# Patient Record
Sex: MALE | URBAN - METROPOLITAN AREA
[De-identification: names, ages, dates, MRNs, and addresses within clinical notes are randomized per-mention and may not be internally consistent; named-entity substitution may affect disease eponyms.]

---

## 2018-08-21 ENCOUNTER — THERAPY VISIT (OUTPATIENT)
Dept: PHYSICAL THERAPY | Facility: CLINIC | Age: 78
End: 2018-08-21
Payer: MEDICARE

## 2018-08-21 DIAGNOSIS — M54.9 BACK PAIN: Primary | ICD-10-CM

## 2018-08-21 PROCEDURE — 97161 PT EVAL LOW COMPLEX 20 MIN: CPT | Mod: GP | Performed by: PHYSICAL THERAPIST

## 2018-08-21 PROCEDURE — G8982 BODY POS GOAL STATUS: HCPCS | Mod: GP | Performed by: PHYSICAL THERAPIST

## 2018-08-21 PROCEDURE — G8981 BODY POS CURRENT STATUS: HCPCS | Mod: GP | Performed by: PHYSICAL THERAPIST

## 2018-08-21 PROCEDURE — 97110 THERAPEUTIC EXERCISES: CPT | Mod: GP | Performed by: PHYSICAL THERAPIST

## 2018-08-21 NOTE — LETTER
DEPARTMENT OF HEALTH AND HUMAN SERVICES  CENTERS FOR MEDICARE & MEDICAID SERVICES    PLAN/UPDATED PLAN OF PROGRESS FOR OUTPATIENT REHABILITATION    PATIENTS NAME:  Wood Magdaleno   : 1940  PROVIDER NUMBER:    [id number]  [id number]   PROVIDER NAME: [facility] VIVIEN RIZVI  MEDICAL RECORD NUMBER: [id number]   START OF CARE DATE:  SOC Date: 18   TYPE:  PT  PRIMARY/TREATMENT DIAGNOSIS: (Pertinent Medical Diagnosis)  Back pain  VISITS FROM START OF CARE:  Rxs Used: 1     [facility] Initial Evaluation  Subjective:  Patient is a 78 year old male presenting with rehab back hpi. The history is provided by the patient. No  was used.   Wood Magdaleno is a 78 year old male with a lumbar condition.  Condition occurred with:  Degenerative joint disease and other reason.  Condition occurred: for unknown reasons.  This is a chronic and recurrent condition  Patient presents to PT today with c/o back pain.  Patient stated he has been dealing with back pain for many years on and off.  Patient stated he has fallen many time due to his balance; last fall a few weeks ago requiring stitches above the L eye.  Patient reported [facility].  Patient referred to PT on 18..    Patient reports pain:  Lower lumbar spine and mid lumbar spine.  Radiates to:  No radiation.  Pain is described as aching and is constant and reported as 5/10.  Associated with: none. Pain is the same all the time and worse in the A.M..  Symptoms are exacerbated by lying down, sitting, lifting, standing and other (transfers) and relieved by NSAID's and activity/movement.  Since onset symptoms are unchanged.  Special tests:  X-ray.      General health as reported by patient is good.  Pertinent medical history includes:  Cancer, high blood pressure, overweight and other (Parkinson's Disease).  Medical allergies:  no.  Surgical history: none reported.  Current medications:  High blood pressure medication.  Current occupation is Retired.      Barriers include:  None as reported by the patient.  Red flags:  None as reported by the patient.    Objective:  Standing Alignment:    Cervical/Thoracic:  Forward head and thoracic kyphosis increased  Shoulder/UE:  Rounded shoulders  Lumbar:  Lordosis decr    Gait:  Used quad cane when he walks; shuffles his feet  Gait Type:  Antalgic   Weight Bearing Status:  WBAT   Assistive Devices:  Cane  Deviations:  Lumbar:  Trunk flexionHip:  Trendelenberg L and Trendelenberg RKnee:  Knee extension decr L and knee extension decr RGeneral Deviations:  Base of support incr, stance time decr and stride length decr                           PATIENTS NAME:  Wood Magdaleno            : 1940  Lumbar/SI Evaluation  ROM:    AROM Lumbar:   Flexion:          Can touch ankle with knee flexed  Ext:                    Neutral spine; pt feels he is falling backwards when he stands up tall   Side Bend:        Left:  Mid thigh    Right:  Mid thigh  Rotation:           Left:     Right:   Side Glide:        Left:     Right:    Lumbar Myotomes:    T12-L3 (Hip Flex):  Left: 4    Right: 4  L2-4 (Quads):  Left:  4    Right:  4  L4 (Ankle DF):  Left:  5    Right:  5  Lumbar DTR's:  not assessed  Lumbar Dermtomes:  normal  Neural Tension/Mobility:  Neural tension wnl lumbar: c/o tightness in each leg when they are lifted.    Left side:SLR or SLR w/DF  negative.   Right side:   SLR w/DF or SLR  negative.   Lumbar Palpation:    Tenderness present at Left:    Quadratus Lumborum and Erector Spinae  Tenderness present at Right: Quadratus Lumborum and Erector Spinae    Spinal Segmental Conclusions:   Level: Hypo noted at T10, T11, T12, L1, L2, L3, L4 and L5  Hip Evaluation  Hip PROM:  Hip PROM:  Left Hip:    Normal  Right Hip:  Normal    Assessment/Plan:    Patient is a 78 year old male with thoracic and lumbar  complaints.    Patient has the following significant findings with corresponding treatment plan.                Diagnosis 1:  Back pain  Pain -  hot/cold therapy and manual therapy  Decreased ROM/flexibility - manual therapy and therapeutic exercise  Decreased joint mobility - manual therapy and therapeutic exercise  Decreased strength - therapeutic exercise and therapeutic activities  Impaired gait - gait training  Impaired muscle performance - neuro re-education  Decreased function - therapeutic activities  Impaired posture - neuro re-education    Therapy Evaluation Codes:   1) History comprised of:   Personal factors that impact the plan of care:      Age and Past/current experiences.    Comorbidity factors that impact the plan of care are:      Parkinson's Disease.     Medications impacting care: None.  2) Examination of Body Systems comprised of:   Body structures and functions that impact the plan of care:      Lumbar spine and Thoracic Spine.                  PATIENTS NAME:  Wood Magdaleno            : 1940   Activity limitations that impact the plan of care are:      Bending, Sitting, Standing and Walking.  3) Clinical presentation characteristics are:   Stable/Uncomplicated.  4) Decision-Making    Low complexity using standardized patient assessment instrument and/or measureable assessment of functional outcome.  Cumulative Therapy Evaluation is: Low complexity.  Previous and current functional limitations:  (See Goal Flow Sheet for this information)    Short term and Long term goals: (See Goal Flow Sheet for this information)   Communication ability:  Patient appears to be able to clearly communicate and understand verbal and written communication and follow directions correctly.  Treatment Explanation - The following has been discussed with the patient:   RX ordered/plan of care  Anticipated outcomes  Possible risks and side effects  This patient would benefit from PT intervention to resume  "normal activities.   Rehab potential is good.    Frequency:  1 X week, once daily  Duration:  for 6 weeks  Discharge Plan:  Achieve all LTG.  Independent in home treatment program.  Reach maximal therapeutic benefit.    Please refer to the daily flowsheet for treatment today, total treatment time and time spent performing 1:1 timed codes.     Caregiver Signature/Credentials ____________________________________ Date ________        Makayla Garcia, JONATHAN   I have reviewed and certified the need for these services and plan of treatment while under my care.        PHYSICIAN'S SIGNATURE:   _____________________________________  Date___________     Brant Giron MD    Certification period:  Beginning of Cert date period: 08/21/18 to  End of Cert period date: 11/18/18     Functional Level Progress Report: Please see attached \"Goal Flow sheet for Functional level.\"    ____X____ Continue Services or       ________ DC Services                Service dates: From  SOC Date: 08/21/18 date to present                         "

## 2018-08-21 NOTE — MR AVS SNAPSHOT
After Visit Summary   8/21/2018    Wood Magdaleno    MRN: [id number]           Patient Information     Date Of Birth          1940        Visit Information        Provider Department      8/21/2018 11:00 AM Makayla Garcia, PT [facility]        Today's Diagnoses     Back pain    -  1       Follow-ups after your visit        Your next 10 appointments already scheduled     Aug 28, 2018 11:00 AM CDT   [facility] with Makayla Garcia PT   [facility] ([facility]  )    [address]  [facility] [id number] [phone]              Who to contact     If you have questions or need follow up information about today's clinic visit or your schedule please contact [facility] directly at [phone].  Normal or non-critical lab and imaging results will be communicated to you by MyChart, letter or phone within 4 business days after the clinic has received the results. If you do not hear from us within 7 days, please contact the clinic through MyChart or phone. If you have a critical or abnormal lab result, we will notify you by phone as soon as possible.  Submit refill requests through [employer] or call your pharmacy and they will forward the refill request to us. Please allow 3 business days for your refill to be completed.          Additional Information About Your Visit        Care EveryWhere ID     This is your Care EveryWhere ID. This could be used by other organizations to access your [place] medical records  [id number]         Blood Pressure from Last 3 Encounters:   No data found for BP    Weight from Last 3 Encounters:   No data found for Wt              We Performed the Following     HC PT EVAL, LOW COMPLEXITY     [facility] CERT REPORT     THERAPEUTIC EXERCISES        Primary Care Provider Office Phone # Fax #    Brant Giron [phone]       [facility]  [facility] [address]  [facility] [place] [address]        Equal Access to Services     DIOGO ALEJO : Hadii poonam jacobo hadkongo Sobonnieali, waaxda luqadaha, qacecyta kaalmada noemímichelle, vanessa varghese rajlyndsey chavezangelito geraldoashwini donald reyna. So [facility] [phone].    ATENCIÓN: Si habla español, tiene a alvarez disposición servicios gratuitos de asistencia lingüística. Llame al [phone].    We comply with applicable federal civil rights laws and [place] laws. We do not discriminate on the basis of race, color, national origin, age, disability, sex, sexual orientation, or gender identity.            Thank you!     Thank you for choosing [facility]  for your care. Our goal is always to provide you with excellent care. Hearing back from our patients is one way we can continue to improve our services. Please take a few minutes to complete the written survey that you may receive in the mail after your visit with us. Thank you!             Your Updated Medication List - Protect others around you: Learn how to safely use, store and throw away your medicines at www.disposemymeds.org.      Notice  As of 8/21/2018 11:59 PM    You have not been prescribed any medications.

## 2018-08-21 NOTE — PROGRESS NOTES
[facility] Initial Evaluation  Subjective:  Patient is a 78 year old male presenting with rehab back hpi. The history is provided by the patient. No  was used.   Wood Magdaleno is a 78 year old male with a lumbar condition.  Condition occurred with:  Degenerative joint disease and other reason.  Condition occurred: for unknown reasons.  This is a chronic and recurrent condition  Patient presents to PT today with c/o back pain.  Patient stated he has been dealing with back pain for many years on and off.  Patient stated he has fallen many time due to his balance; last fall a few weeks ago requiring stitches above the L eye.  Patient reported [facility].  Patient referred to PT on 8/16/18..    Patient reports pain:  Lower lumbar spine and mid lumbar spine.  Radiates to:  No radiation.  Pain is described as aching and is constant and reported as 5/10.  Associated with: none. Pain is the same all the time and worse in the A.M..  Symptoms are exacerbated by lying down, sitting, lifting, standing and other (transfers) and relieved by NSAID's and activity/movement.  Since onset symptoms are unchanged.  Special tests:  X-ray.      General health as reported by patient is good.  Pertinent medical history includes:  Cancer, high blood pressure, overweight and other (Parkinson's Disease).  Medical allergies: no.  Surgical history: none reported.  Current medications:  High blood pressure medication.  Current occupation is Retired.        Barriers include:  None as reported by the patient.    Red flags:  None as reported by the patient.                        Objective:  Standing Alignment:    Cervical/Thoracic:  Forward head and thoracic kyphosis increased  Shoulder/UE:  Rounded shoulders  Lumbar:  Lordosis decr            Gait:  Used quad cane when he walks; shuffles his feet  Gait Type:  Antalgic   Weight Bearing  Status:  WBAT   Assistive Devices:  Cane  Deviations:  Lumbar:  Trunk flexionHip:  Trendelenberg L and Trendelenberg RKnee:  Knee extension decr L and knee extension decr RGeneral Deviations:  Base of support incr, stance time decr and stride length decr               Lumbar/SI Evaluation  ROM:    AROM Lumbar:   Flexion:          Can touch ankle with knee flexed  Ext:                    Neutral spine; pt feels he is falling backwards when he stands up tall   Side Bend:        Left:  Mid thigh    Right:  Mid thigh  Rotation:           Left:     Right:   Side Glide:        Left:     Right:           Lumbar Myotomes:    T12-L3 (Hip Flex):  Left: 4    Right: 4  L2-4 (Quads):  Left:  4    Right:  4  L4 (Ankle DF):  Left:  5    Right:  5      Lumbar DTR's:  not assessed        Lumbar Dermtomes:  normal                Neural Tension/Mobility:  Neural tension wnl lumbar: c/o tightness in each leg when they are lifted.      Left side:SLR or SLR w/DF  negative.     Right side:   SLR w/DF or SLR  negative.   Lumbar Palpation:    Tenderness present at Left:    Quadratus Lumborum and Erector Spinae  Tenderness present at Right: Quadratus Lumborum and Erector Spinae      Spinal Segmental Conclusions:     Level: Hypo noted at T10, T11, T12, L1, L2, L3, L4 and L5                                        Hip Evaluation  Hip PROM:  Hip PROM:  Left Hip:    Normal  Right Hip:  Normal                                           General     ROS    Assessment/Plan:    Patient is a 78 year old male with thoracic and lumbar complaints.    Patient has the following significant findings with corresponding treatment plan.                Diagnosis 1:  Back pain  Pain -  hot/cold therapy and manual therapy  Decreased ROM/flexibility - manual therapy and therapeutic exercise  Decreased joint mobility - manual therapy and therapeutic exercise  Decreased strength - therapeutic exercise and therapeutic activities  Impaired gait - gait training  Impaired  muscle performance - neuro re-education  Decreased function - therapeutic activities  Impaired posture - neuro re-education    Therapy Evaluation Codes:   1) History comprised of:   Personal factors that impact the plan of care:      Age and Past/current experiences.    Comorbidity factors that impact the plan of care are:      Parkinson's Disease.     Medications impacting care: None.  2) Examination of Body Systems comprised of:   Body structures and functions that impact the plan of care:      Lumbar spine and Thoracic Spine.   Activity limitations that impact the plan of care are:      Bending, Sitting, Standing and Walking.  3) Clinical presentation characteristics are:   Stable/Uncomplicated.  4) Decision-Making    Low complexity using standardized patient assessment instrument and/or measureable assessment of functional outcome.  Cumulative Therapy Evaluation is: Low complexity.    Previous and current functional limitations:  (See Goal Flow Sheet for this information)    Short term and Long term goals: (See Goal Flow Sheet for this information)     Communication ability:  Patient appears to be able to clearly communicate and understand verbal and written communication and follow directions correctly.  Treatment Explanation - The following has been discussed with the patient:   RX ordered/plan of care  Anticipated outcomes  Possible risks and side effects  This patient would benefit from PT intervention to resume normal activities.   Rehab potential is good.    Frequency:  1 X week, once daily  Duration:  for 6 weeks  Discharge Plan:  Achieve all LTG.  Independent in home treatment program.  Reach maximal therapeutic benefit.    Please refer to the daily flowsheet for treatment today, total treatment time and time spent performing 1:1 timed codes.

## 2018-08-23 PROBLEM — M54.9 BACK PAIN: Status: ACTIVE | Noted: 2018-08-23

## 2018-08-28 ENCOUNTER — THERAPY VISIT (OUTPATIENT)
Dept: PHYSICAL THERAPY | Facility: CLINIC | Age: 78
End: 2018-08-28
Payer: MEDICARE

## 2018-08-28 DIAGNOSIS — M54.9 BACK PAIN, UNSPECIFIED BACK LOCATION, UNSPECIFIED BACK PAIN LATERALITY, UNSPECIFIED CHRONICITY: ICD-10-CM

## 2018-08-28 PROCEDURE — 97110 THERAPEUTIC EXERCISES: CPT | Mod: GP | Performed by: PHYSICAL THERAPIST

## 2018-08-28 PROCEDURE — 97035 APP MDLTY 1+ULTRASOUND EA 15: CPT | Mod: GP | Performed by: PHYSICAL THERAPIST

## 2018-08-28 PROCEDURE — 97140 MANUAL THERAPY 1/> REGIONS: CPT | Mod: GP | Performed by: PHYSICAL THERAPIST

## 2018-09-05 ENCOUNTER — THERAPY VISIT (OUTPATIENT)
Dept: PHYSICAL THERAPY | Facility: CLINIC | Age: 78
End: 2018-09-05
Payer: MEDICARE

## 2018-09-05 DIAGNOSIS — M54.9 BACK PAIN, UNSPECIFIED BACK LOCATION, UNSPECIFIED BACK PAIN LATERALITY, UNSPECIFIED CHRONICITY: ICD-10-CM

## 2018-09-05 PROCEDURE — 97035 APP MDLTY 1+ULTRASOUND EA 15: CPT | Mod: GP | Performed by: PHYSICAL THERAPIST

## 2018-09-05 PROCEDURE — 97140 MANUAL THERAPY 1/> REGIONS: CPT | Mod: GP | Performed by: PHYSICAL THERAPIST

## 2018-09-05 PROCEDURE — 97110 THERAPEUTIC EXERCISES: CPT | Mod: GP | Performed by: PHYSICAL THERAPIST

## 2018-09-12 ENCOUNTER — THERAPY VISIT (OUTPATIENT)
Dept: PHYSICAL THERAPY | Facility: CLINIC | Age: 78
End: 2018-09-12
Payer: MEDICARE

## 2018-09-12 DIAGNOSIS — M54.9 BACK PAIN, UNSPECIFIED BACK LOCATION, UNSPECIFIED BACK PAIN LATERALITY, UNSPECIFIED CHRONICITY: ICD-10-CM

## 2018-09-12 PROCEDURE — 97110 THERAPEUTIC EXERCISES: CPT | Mod: GP | Performed by: PHYSICAL THERAPIST

## 2018-09-12 PROCEDURE — 97035 APP MDLTY 1+ULTRASOUND EA 15: CPT | Mod: GP | Performed by: PHYSICAL THERAPIST

## 2018-09-12 PROCEDURE — 97140 MANUAL THERAPY 1/> REGIONS: CPT | Mod: GP | Performed by: PHYSICAL THERAPIST

## 2018-09-19 ENCOUNTER — THERAPY VISIT (OUTPATIENT)
Dept: PHYSICAL THERAPY | Facility: CLINIC | Age: 78
End: 2018-09-19
Payer: MEDICARE

## 2018-09-19 DIAGNOSIS — M54.9 BACK PAIN, UNSPECIFIED BACK LOCATION, UNSPECIFIED BACK PAIN LATERALITY, UNSPECIFIED CHRONICITY: ICD-10-CM

## 2018-09-19 PROCEDURE — 97140 MANUAL THERAPY 1/> REGIONS: CPT | Mod: GP | Performed by: PHYSICAL THERAPIST

## 2018-09-19 PROCEDURE — 97035 APP MDLTY 1+ULTRASOUND EA 15: CPT | Mod: GP | Performed by: PHYSICAL THERAPIST

## 2018-09-19 PROCEDURE — 97110 THERAPEUTIC EXERCISES: CPT | Mod: GP | Performed by: PHYSICAL THERAPIST

## 2018-09-19 NOTE — MR AVS SNAPSHOT
After Visit Summary   9/19/2018    Wood Magdaleno    MRN: [id number]           Patient Information     Date Of Birth          1940        Visit Information        Provider Department      9/19/2018 1:00 PM Makayla Garcia PT [facility]        Today's Diagnoses     Back pain, unspecified back location, unspecified back pain laterality, unspecified chronicity           Follow-ups after your visit        Who to contact     If you have questions or need follow up information about today's clinic visit or your schedule please contact [facility] directly at [phone].  Normal or non-critical lab and imaging results will be communicated to you by MyChart, letter or phone within 4 business days after the clinic has received the results. If you do not hear from us within 7 days, please contact the clinic through MyChart or phone. If you have a critical or abnormal lab result, we will notify you by phone as soon as possible.  Submit refill requests through [employer] or call your pharmacy and they will forward the refill request to us. Please allow 3 business days for your refill to be completed.          Additional Information About Your Visit        Care EveryWhere ID     This is your Care EveryWhere ID. This could be used by other organizations to access your [place] medical records  [id number]         Blood Pressure from Last 3 Encounters:   No data found for BP    Weight from Last 3 Encounters:   No data found for Wt              We Performed the Following     MANUAL THER TECH,1+REGIONS,EA 15 MIN     THERAPEUTIC EXERCISES     ULTRASOUND THERAPY        Primary Care Provider Office Phone # Fax #    Brant Giron [phone]       [place] [facility] [address]  [facility] [address]        Equal Access to Services     DIOGO ALEJO : tanvi Conklin,  vanessa rubin rajlyndsey chavezangelito geraldoashwini bennett ah. So [facility] [phone].    ATENCIÓN: Si habla bailee, tiene a alvarez disposición servicios gratuitos de asistencia lingüística. Llame al [phone].    We comply with applicable federal civil rights laws and [place] laws. We do not discriminate on the basis of race, color, national origin, age, disability, sex, sexual orientation, or gender identity.            Thank you!     Thank you for choosing [place] [facility]  for your care. Our goal is always to provide you with excellent care. Hearing back from our patients is one way we can continue to improve our services. Please take a few minutes to complete the written survey that you may receive in the mail after your visit with us. Thank you!             Your Updated Medication List - Protect others around you: Learn how to safely use, store and throw away your medicines at www.disposemymeds.org.      Notice  As of 9/19/2018  2:50 PM    You have not been prescribed any medications.

## 2019-11-27 PROBLEM — M54.9 BACK PAIN: Status: RESOLVED | Noted: 2018-08-23 | Resolved: 2019-11-27

## 2019-11-27 NOTE — PROGRESS NOTES
Discharge Note    Progress reporting period is from initial evaluation date (please see noted date below) to Sep 19, 2018.  No linked episodes      Wood failed to follow up and current status is unknown.  Please see information below for last relevant information on current status.  Patient seen for 5 visits.    SUBJECTIVE  Subjective changes noted by patient:  Pt stated he had 2 falls since he was seen last week; one fall was so hard he broke the tank of his tolet off the toilet.  Stated he was able to get up from each fall on his own power.  Patient current has c/o back discomfort (not really pain).  He says his back is stiff.  .  Current pain level is 3/10.     Previous pain level was  5/10.   Changes in function:  Yes (See Goal flowsheet attached for changes in current functional level)  Adverse reaction to treatment or activity: None    OBJECTIVE  Changes noted in objective findings: observation: bruising noted R lower flank (size of a quarter); small scrap also noted in same area. standing posture:  forward flexed with left trunk lean.  Gait: pt carries his quad cane with him; forward gait is normal; when asked to step backwards the step length decreases and pt begins to shuffle.  TROM: touches below knees with flexion, unable to reach neutral spine with ext.      ASSESSMENT/PLAN  Diagnosis: Chronic LBP   Updated problem list and treatment plan:   Pain - HEP  STG/LTGs have been met or progress has been made towards goals:  Yes, please see goal flowsheet for most current information  Assessment of Progress: current status is unknown.    Last current status:     Self Management Plans:  HEP  I have re-evaluated this patient and find that the nature, scope, duration and intensity of the therapy is appropriate for the medical condition of the patient.  Wood continues to require the following intervention to meet STG and LTG's:  HEP.    Recommendations:  Discharge with current home program.  Patient to follow  up with MD as needed.    Please refer to the daily flowsheet for treatment today, total treatment time and time spent performing 1:1 timed codes.